# Patient Record
Sex: MALE | Race: WHITE | Employment: FULL TIME | URBAN - METROPOLITAN AREA
[De-identification: names, ages, dates, MRNs, and addresses within clinical notes are randomized per-mention and may not be internally consistent; named-entity substitution may affect disease eponyms.]

---

## 2022-07-08 ENCOUNTER — HOSPITAL ENCOUNTER (EMERGENCY)
Age: 37
Discharge: HOME | End: 2022-07-08
Attending: STUDENT IN AN ORGANIZED HEALTH CARE EDUCATION/TRAINING PROGRAM
Payer: OTHER MISCELLANEOUS

## 2022-07-08 VITALS
SYSTOLIC BLOOD PRESSURE: 130 MMHG | HEART RATE: 73 BPM | DIASTOLIC BLOOD PRESSURE: 84 MMHG | TEMPERATURE: 98 F | RESPIRATION RATE: 16 BRPM | OXYGEN SATURATION: 99 %

## 2022-07-08 DIAGNOSIS — S01.81XA FACIAL LACERATION, INITIAL ENCOUNTER: Primary | ICD-10-CM

## 2022-07-08 PROCEDURE — 99282 EMERGENCY DEPT VISIT SF MDM: CPT

## 2022-07-08 PROCEDURE — 75810000293 HC SIMP/SUPERF WND  RPR

## 2022-07-08 NOTE — ED TRIAGE NOTES
Pt was at work this am when a beam from a shelf fell and hit him in his head. Pt has forehead laceration noted. Ice applied.  Pt denies LOC

## 2022-07-08 NOTE — ED PROVIDER NOTES
EMERGENCY DEPARTMENT HISTORY AND PHYSICAL EXAM    I have evaluated the patient at 7:23 AM      Date: 7/8/2022  Patient Name: Franklin Biswas    History of Presenting Illness     Chief Complaint   Patient presents with    Laceration         History Provided By: Patient  Location/Duration/Severity/Modifying factors   Patient is a 78-year-old otherwise healthy male presenting to the emergency department for evaluation of laceration to his forehead. Patient was working at [employer] putting a metal shelf together when one of the metal beams popped out of place and hit him in the forehead. No loss of consciousness. He did sustain a simple 3 cm laceration to his forehead just right of midline. No other injuries. No other complaints. Tetanus is up-to-date. PCP: No primary care provider on file. Past History     Past Medical History:  No past medical history on file. Past Surgical History:  No past surgical history on file. Family History:  No family history on file. Social History:  Social History     Tobacco Use    Smoking status: Never Smoker    Smokeless tobacco: Never Used   Substance Use Topics    Alcohol use: Not on file    Drug use: Yes     Types: Marijuana       Allergies: Allergies   Allergen Reactions    Egg Unknown (comments)         Review of Systems       Review of Systems   Constitutional: Negative for activity change, chills, diaphoresis, fatigue and fever. Respiratory: Negative for cough, chest tightness, shortness of breath, wheezing and stridor. Cardiovascular: Negative for chest pain and palpitations. Gastrointestinal: Negative for abdominal pain, nausea and vomiting. Musculoskeletal: Negative for back pain, joint swelling, myalgias, neck pain and neck stiffness. Skin: Positive for wound. Negative for rash. Forehead laceration   Neurological: Negative for dizziness, speech difficulty, weakness, light-headedness, numbness and headaches. Psychiatric/Behavioral: Negative for agitation. The patient is not nervous/anxious. Physical Exam     Visit Vitals  /84 (BP 1 Location: Left arm, BP Patient Position: At rest;Sitting)   Pulse 73   Temp 98 °F (36.7 °C)   Resp 16   SpO2 99%         Physical Exam  Constitutional:       General: He is not in acute distress. Appearance: He is not toxic-appearing. HENT:      Head: Normocephalic. Comments: 3 cm linear laceration to patient's forehead just right of midline     Mouth/Throat:      Mouth: Mucous membranes are moist.   Eyes:      Extraocular Movements: Extraocular movements intact. Pupils: Pupils are equal, round, and reactive to light. Cardiovascular:      Rate and Rhythm: Normal rate and regular rhythm. Heart sounds: Normal heart sounds. No murmur heard. No friction rub. No gallop. Pulmonary:      Effort: Pulmonary effort is normal.      Breath sounds: Normal breath sounds. Abdominal:      General: There is no distension. Palpations: Abdomen is soft. There is no mass. Tenderness: There is no abdominal tenderness. There is no guarding. Hernia: No hernia is present. Musculoskeletal:         General: No swelling, tenderness or deformity. Cervical back: Normal range of motion and neck supple. No rigidity or tenderness. Skin:     General: Skin is warm and dry. Findings: No rash. Neurological:      General: No focal deficit present. Mental Status: He is alert and oriented to person, place, and time. Cranial Nerves: No cranial nerve deficit. Sensory: No sensory deficit. Motor: No weakness. Psychiatric:         Mood and Affect: Mood normal.           Diagnostic Study Results     Labs -  No results found for this or any previous visit (from the past 12 hour(s)). Radiologic Studies -   No orders to display         Medical Decision Making   I am the first provider for this patient.     I reviewed the vital signs, available nursing notes, past medical history, past surgical history, family history and social history. Vital Signs-Reviewed the patient's vital signs    Records Reviewed: Nursing Notes (Time of Review: 7:23 AM)    ED Course: Progress Notes, Reevaluation, and Consults:         Provider Notes (Medical Decision Making):   MDM  Number of Diagnoses or Management Options  Facial laceration, initial encounter  Diagnosis management comments: 18-year-old male presenting for evaluation of laceration to forehead. Patient is alert and oriented and neurologically intact. No need for imaging at this time. Repaired with Dermabond. Patient has been given instructions on wound care at home and ED return precautions. Patient verbalizes understanding and agreement with plan. Stable for discharge               Wound Closure by Adhesive    Date/Time: 7/8/2022 7:26 AM  Performed by: Aarti Dejesus DO  Authorized by: Aarti Dejesus DO     Consent:     Consent obtained:  Verbal    Consent given by:  Patient    Risks discussed:  Infection and need for additional repair    Alternatives discussed:  No treatment  Anesthesia (see MAR for exact dosages): Anesthesia method:  None  Laceration details:     Location:  Face    Face location:  Forehead    Length (cm):  3    Depth (mm):  1  Repair type:     Repair type:  Simple  Exploration:     Contaminated: no    Treatment:     Area cleansed with:  Soap and water    Amount of cleaning:  Standard    Visualized foreign bodies/material removed: no    Skin repair:     Repair method:  Tissue adhesive  Approximation:     Approximation:  Close  Post-procedure details:     Dressing:  Non-adherent dressing    Patient tolerance of procedure: Tolerated well, no immediate complications        Diagnosis     Clinical Impression:   1.  Facial laceration, initial encounter        Disposition: home    Follow-up Information     Follow up With Specialties Details Why Contact Info    [facility] EMERGENCY DEPT Emergency Medicine  As needed, If symptoms worsen [address] [facility]  [phone]           Patient's Medications    No medications on file     Disclaimer: Sections of this note are dictated using utilizing voice recognition software. Minor typographical errors may be present. If questions arise, please do not hesitate to contact me or call our department.

## 2022-07-08 NOTE — DISCHARGE INSTRUCTIONS
Keep your laceration dry and covered for the next 24 hours.   The emergency department for any fevers or chills, worsening pain, redness around the wound or pus draining from the wound